# Patient Record
Sex: FEMALE | Race: WHITE | Employment: UNEMPLOYED | URBAN - METROPOLITAN AREA
[De-identification: names, ages, dates, MRNs, and addresses within clinical notes are randomized per-mention and may not be internally consistent; named-entity substitution may affect disease eponyms.]

---

## 2017-08-25 PROBLEM — F45.41 STRESS HEADACHE: Status: ACTIVE | Noted: 2017-08-25

## 2017-08-25 PROBLEM — R63.0 ANOREXIA: Status: ACTIVE | Noted: 2017-08-25

## 2017-08-25 PROBLEM — F31.9 BIPOLAR I DISORDER (HCC): Status: ACTIVE | Noted: 2017-08-25

## 2017-11-08 PROBLEM — B34.9 VIRAL SYNDROME: Status: ACTIVE | Noted: 2017-11-08

## 2017-11-08 PROBLEM — R07.9 CHEST PAIN: Status: ACTIVE | Noted: 2017-11-08

## 2017-12-01 PROBLEM — L70.0 ACNE VULGARIS: Status: ACTIVE | Noted: 2017-12-01

## 2017-12-01 PROBLEM — R07.9 CHEST PAIN: Status: RESOLVED | Noted: 2017-11-08 | Resolved: 2017-12-01

## 2017-12-01 PROBLEM — K59.04 FUNCTIONAL CONSTIPATION: Status: ACTIVE | Noted: 2017-12-01

## 2017-12-01 PROBLEM — B34.9 VIRAL SYNDROME: Status: RESOLVED | Noted: 2017-11-08 | Resolved: 2017-12-01

## 2018-06-04 PROBLEM — F45.41 STRESS HEADACHE: Status: RESOLVED | Noted: 2017-08-25 | Resolved: 2018-06-04

## 2018-06-04 PROBLEM — K59.04 FUNCTIONAL CONSTIPATION: Status: RESOLVED | Noted: 2017-12-01 | Resolved: 2018-06-04

## 2018-10-09 PROBLEM — C43.71 MALIGNANT MELANOMA OF RIGHT LOWER EXTREMITY INCLUDING HIP (HCC): Status: ACTIVE | Noted: 2018-10-09

## 2018-10-09 PROBLEM — K60.2 ANAL FISSURE: Status: ACTIVE | Noted: 2018-10-09

## 2018-10-10 ENCOUNTER — HOSPITAL ENCOUNTER (OUTPATIENT)
Dept: GENERAL RADIOLOGY | Age: 33
Discharge: HOME | End: 2018-10-10
Attending: FAMILY MEDICINE
Payer: COMMERCIAL

## 2018-10-10 DIAGNOSIS — K60.2 ANAL FISSURE: ICD-10-CM

## 2018-10-10 DIAGNOSIS — K59.04 FUNCTIONAL CONSTIPATION: ICD-10-CM

## 2018-10-10 PROCEDURE — 74018 RADEX ABDOMEN 1 VIEW: CPT

## 2018-10-10 NOTE — PROGRESS NOTES
She is indeed very constipated. Go to the pharmacy and buy two 32oz bottles of gatorade and a 238g bottle of Miralax. Mix half the Miralax into each of the 32oz bottles. Shake well. Refrigerating can help with the bitter taste. Chug one, if no bowel movement within 2 hours, shake and chug the other. Pt should plan on doing this when they have nothing to do for 12-24 hours. There should be significant stooling, may look dark/green, followed by stools of only clear water. Once that's done I'd recommend staying on q cap of miralax with 12oz of juice every day for at least the next 6 months.

## 2018-11-28 PROBLEM — H90.5 SENSORINEURAL HEARING LOSS (SNHL) OF LEFT EAR: Status: ACTIVE | Noted: 2018-11-28

## 2018-11-28 PROBLEM — R11.0 NAUSEA: Status: ACTIVE | Noted: 2018-11-28

## 2018-11-28 PROBLEM — G44.59 OTHER COMPLICATED HEADACHE SYNDROME: Status: ACTIVE | Noted: 2018-11-28

## 2018-12-11 ENCOUNTER — HOSPITAL ENCOUNTER (OUTPATIENT)
Dept: MRI IMAGING | Age: 33
Discharge: HOME | End: 2018-12-11
Attending: FAMILY MEDICINE
Payer: COMMERCIAL

## 2018-12-11 DIAGNOSIS — G44.59 OTHER COMPLICATED HEADACHE SYNDROME: ICD-10-CM

## 2018-12-11 DIAGNOSIS — H90.5 SENSORINEURAL HEARING LOSS (SNHL) OF LEFT EAR, UNSPECIFIED HEARING STATUS ON CONTRALATERAL SIDE: ICD-10-CM

## 2018-12-11 DIAGNOSIS — R11.0 NAUSEA: ICD-10-CM

## 2018-12-11 PROCEDURE — 70553 MRI BRAIN STEM W/O & W/DYE: CPT

## 2018-12-11 PROCEDURE — A9575 INJ GADOTERATE MEGLUMI 0.1ML: HCPCS

## 2018-12-11 PROCEDURE — 74011250636 HC RX REV CODE- 250/636

## 2018-12-11 RX ORDER — GADOTERATE MEGLUMINE 376.9 MG/ML
8 INJECTION INTRAVENOUS
Status: COMPLETED | OUTPATIENT
Start: 2018-12-11 | End: 2018-12-11

## 2018-12-11 RX ORDER — SODIUM CHLORIDE 0.9 % (FLUSH) 0.9 %
10 SYRINGE (ML) INJECTION
Status: COMPLETED | OUTPATIENT
Start: 2018-12-11 | End: 2018-12-11

## 2018-12-11 RX ADMIN — GADOTERATE MEGLUMINE 8 ML: 376.9 INJECTION INTRAVENOUS at 14:34

## 2018-12-11 RX ADMIN — Medication 10 ML: at 14:34
